# Patient Record
Sex: FEMALE | Race: OTHER | NOT HISPANIC OR LATINO | URBAN - METROPOLITAN AREA
[De-identification: names, ages, dates, MRNs, and addresses within clinical notes are randomized per-mention and may not be internally consistent; named-entity substitution may affect disease eponyms.]

---

## 2017-01-23 ENCOUNTER — EMERGENCY (EMERGENCY)
Facility: HOSPITAL | Age: 32
LOS: 1 days | Discharge: HOME | End: 2017-01-23
Attending: EMERGENCY MEDICINE
Payer: MEDICAID

## 2017-01-23 VITALS
HEIGHT: 64 IN | SYSTOLIC BLOOD PRESSURE: 121 MMHG | RESPIRATION RATE: 18 BRPM | OXYGEN SATURATION: 100 % | DIASTOLIC BLOOD PRESSURE: 85 MMHG | HEART RATE: 97 BPM | WEIGHT: 119.93 LBS | TEMPERATURE: 98 F
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
## 2017-01-23 DIAGNOSIS — S39.012A STRAIN OF MUSCLE, FASCIA AND TENDON OF LOWER BACK, INITIAL ENCOUNTER: ICD-10-CM

## 2017-01-23 PROCEDURE — 99283 EMERGENCY DEPT VISIT LOW MDM: CPT

## 2017-01-23 RX ORDER — IBUPROFEN 200 MG
600 TABLET ORAL ONCE
Qty: 0 | Refills: 0 | Status: COMPLETED | OUTPATIENT
Start: 2017-01-23 | End: 2017-01-23

## 2017-01-23 RX ORDER — METHOCARBAMOL 500 MG/1
1 TABLET, FILM COATED ORAL
Qty: 12 | Refills: 0 | OUTPATIENT
Start: 2017-01-23 | End: 2017-01-27

## 2017-01-23 RX ORDER — IBUPROFEN 200 MG
1 TABLET ORAL
Qty: 20 | Refills: 0 | OUTPATIENT
Start: 2017-01-23 | End: 2017-01-28

## 2017-01-23 RX ORDER — METHOCARBAMOL 500 MG/1
750 TABLET, FILM COATED ORAL ONCE
Qty: 0 | Refills: 0 | Status: COMPLETED | OUTPATIENT
Start: 2017-01-23 | End: 2017-01-23

## 2017-01-23 RX ADMIN — Medication 600 MILLIGRAM(S): at 16:38

## 2017-01-23 RX ADMIN — METHOCARBAMOL 750 MILLIGRAM(S): 500 TABLET, FILM COATED ORAL at 16:00

## 2017-01-23 RX ADMIN — Medication 600 MILLIGRAM(S): at 16:00

## 2017-01-23 NOTE — ED PROVIDER NOTE - MEDICAL DECISION MAKING DETAILS
30 y/o F pt presents w/ L upper back pain x1 week. Well appearing, vitals within normal limits, afebrile. History and findings consistent w/ muscle strain. Plan Ibuprofen, Robaxin, and D/C w/ PMD f/u.

## 2017-01-23 NOTE — ED PROVIDER NOTE - NS ED MD SCRIBE ATTENDING SCRIBE SECTIONS
PAST MEDICAL/SURGICAL/SOCIAL HISTORY/HIV/PHYSICAL EXAM/VITAL SIGNS( Pullset)/REVIEW OF SYSTEMS/HISTORY OF PRESENT ILLNESS/DISPOSITION

## 2017-01-23 NOTE — ED PROVIDER NOTE - ATTENDING CONTRIBUTION TO CARE
mid back pain  on and off several months  onset after heavy lifting at work  flareup again after lifting  paraspinal midthoracic ttp  improved after meds  NSAIDs, back specialist f/u  refrain from heavy lifting

## 2017-01-23 NOTE — ED PROVIDER NOTE - PROGRESS NOTE DETAILS
Feels better. History and findings suggestive of muscle strain. Plan: IBU and RObaxin Rx and discharge with PMD follow up. Pt is well appearing walking with steady gait, stable for discharge and follow up without fail with medical doctor. I had a detailed discussion with the patient and/or guardian regarding the historical points, exam findings, and any diagnostic results supporting the discharge diagnosis. Pt educated on care and need for follow up. Strict return instructions and red flag signs and symptoms discussed with patient. Questions answered. Pt shows understanding of discharge information and agrees to follow. The scribe's documentation has been prepared under my direction and personally reviewed by me in its entirety. I confirm that the note above actually reflects all work, treatment, procedures, and medical decision-making performed by me - ERICKA Schmitz

## 2017-01-23 NOTE — ED PROVIDER NOTE - OBJECTIVE STATEMENT
30 y/o M pt w/ no significant PMHx presents to ED c/o worsening L sided back pain x1 week. Pt describes her back pain as tightness. Pt states that her pain is worsened w/ movement. Pt denies fever, rash, urinary/bowel dysfunction, saddle anesthesia, or any other complaints. Pt states that she has taken Tylenol to mild relief. Pt notes that she is R hand dominant but carries her bag on her L shoulder normally. NKDA.

## 2017-10-02 ENCOUNTER — EMERGENCY (EMERGENCY)
Facility: HOSPITAL | Age: 32
LOS: 1 days | Discharge: HOME | End: 2017-10-02
Admitting: EMERGENCY MEDICINE
Payer: SELF-PAY

## 2017-10-02 VITALS
RESPIRATION RATE: 17 BRPM | HEART RATE: 95 BPM | WEIGHT: 126.99 LBS | TEMPERATURE: 99 F | OXYGEN SATURATION: 98 % | DIASTOLIC BLOOD PRESSURE: 91 MMHG | SYSTOLIC BLOOD PRESSURE: 136 MMHG | HEIGHT: 64 IN

## 2017-10-02 DIAGNOSIS — S61.212A LACERATION WITHOUT FOREIGN BODY OF RIGHT MIDDLE FINGER WITHOUT DAMAGE TO NAIL, INITIAL ENCOUNTER: ICD-10-CM
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
## 2017-10-02 DIAGNOSIS — S61.411A LACERATION WITHOUT FOREIGN BODY OF RIGHT HAND, INITIAL ENCOUNTER: ICD-10-CM
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
## 2017-10-02 PROCEDURE — 99283 EMERGENCY DEPT VISIT LOW MDM: CPT | Mod: 25

## 2017-10-02 PROCEDURE — 90471 IMMUNIZATION ADMIN: CPT

## 2017-10-02 PROCEDURE — 90715 TDAP VACCINE 7 YRS/> IM: CPT

## 2017-10-02 PROCEDURE — 12001 RPR S/N/AX/GEN/TRNK 2.5CM/<: CPT

## 2017-10-02 RX ORDER — TETANUS TOXOID, REDUCED DIPHTHERIA TOXOID AND ACELLULAR PERTUSSIS VACCINE, ADSORBED 5; 2.5; 8; 8; 2.5 [IU]/.5ML; [IU]/.5ML; UG/.5ML; UG/.5ML; UG/.5ML
0.5 SUSPENSION INTRAMUSCULAR ONCE
Qty: 0 | Refills: 0 | Status: COMPLETED | OUTPATIENT
Start: 2017-10-02 | End: 2017-10-02

## 2017-10-02 RX ADMIN — TETANUS TOXOID, REDUCED DIPHTHERIA TOXOID AND ACELLULAR PERTUSSIS VACCINE, ADSORBED 0.5 MILLILITER(S): 5; 2.5; 8; 8; 2.5 SUSPENSION INTRAMUSCULAR at 19:25

## 2017-10-02 NOTE — ED ADULT TRIAGE NOTE - ARRIVAL INFO ADDITIONAL COMMENTS
+laceration to right hand while reaching under the cooler and cut right hand. No bleeding, denies any numbness or tingling. Tetanus status unknown.

## 2017-10-02 NOTE — ED PROCEDURE NOTE - CPROC ED POST PROC CARE GUIDE1
Keep the cast/splint/dressing clean and dry./Verbal/written post procedure instructions were given to patient/caregiver./Bacitracin and sterile dressing./Instructed patient/caregiver to follow-up with primary care physician./Instructed patient/caregiver regarding signs and symptoms of infection.

## 2017-10-02 NOTE — ED ADULT NURSE NOTE - CHPI ED SYMPTOMS NEG
no bleeding at site/no chills/no vomiting/no fever/no drainage/no bleeding/no redness/no purulent drainage/no red streaks

## 2017-10-02 NOTE — ED PROVIDER NOTE - PHYSICAL EXAMINATION
GEN: awake, alert, NAD  EYES: Clear, Pupils equal and round.  PULM: Lungs clear  CV: RRR S1S2  GI: Abd soft, nontender  MSK: Right hand with 1.5 cm laceration over the dorsal hand, apx 2.5 cm proximal to the middle finger MCP joint.  No bleeding, and was able to be explored to base in bloodless field.  No foreign body.  Extensor tendon visualized through full ROM and there is no tendon injury.  Normal tendon function.  Fingers pink with brisk capillary refill.  No bony tenderness.  SKIN: normal color and turgor, no rash  NEURO: Alert, oriented. No gross movement abnormalities.  Speech clear.  Gait steady.  Motor and sensation intact in MRU distributions. GEN: awake, alert, NAD  EYES: Clear, Pupils equal and round.  PULM: Lungs clear  CV: RRR S1S2  GI: Abd soft, nontender  MSK: Right hand with 2 cm laceration over the dorsal hand, apx 2.5 cm proximal to the middle finger MCP joint.  No bleeding, and was able to be explored to base in bloodless field.  No foreign body.  Extensor tendon visualized through full ROM and there is no tendon injury.  Normal tendon function.  Fingers pink with brisk capillary refill.  No bony tenderness.  SKIN: normal color and turgor, no rash  NEURO: Alert, oriented. No gross movement abnormalities.  Speech clear.  Gait steady.  Motor and sensation intact in MRU distributions.

## 2017-10-02 NOTE — ED ADULT NURSE NOTE - OBJECTIVE STATEMENT
Patient comes in ambulatory into [facility] for laceration to right hand. Patient reports, "I was reaching into a cooler and cut myself on a piece of metal." Tetanus status unknown. Denies any numbness or tingling. +2 lacerations noted on right top of hand (1) approx 1cm x0.2cm and (2) 0.5cmx0.1cm, no active bleeding noted. Patient able to move all fingers bilaterally, cap refill less than 3 secs bilaterally, radial pulses good bilaterally, normal sensation in both hands bilaterally when touched with finger.

## 2017-10-02 NOTE — ED PROVIDER NOTE - OBJECTIVE STATEMENT
Pt is a RHD female who was at work today at 4:30 pm when she reached under a cooler and lacerated the dorsum of her right hand against a sharp edge.  No tingling, numbness, or swelling.   She applied sugar to try to control the bleeding.  Unknown last tetanus booster, says she had childhood vaccines.

## 2017-10-12 ENCOUNTER — EMERGENCY (EMERGENCY)
Facility: HOSPITAL | Age: 32
LOS: 1 days | Discharge: HOME | End: 2017-10-12
Attending: EMERGENCY MEDICINE
Payer: MEDICAID

## 2017-10-12 VITALS
RESPIRATION RATE: 18 BRPM | HEIGHT: 64 IN | TEMPERATURE: 98 F | DIASTOLIC BLOOD PRESSURE: 71 MMHG | SYSTOLIC BLOOD PRESSURE: 114 MMHG | WEIGHT: 126.99 LBS | OXYGEN SATURATION: 100 % | HEART RATE: 91 BPM

## 2017-10-12 PROCEDURE — G0463: CPT

## 2017-10-12 NOTE — ED PROVIDER NOTE - OBJECTIVE STATEMENT
33 y/o F pt w/ no significant PMHx presents to ED c/o R hand swelling since yesterday. Pt states that she had sutures placed to a R hand laceration 10 days ago; pt was told to present after 10-11 days for suture removal. Pt reports that she has been working w/ her hands since getting stitches. Pt denies fever, chills, or any other complaints. NKDA.

## 2017-10-12 NOTE — ED ADULT NURSE NOTE - OBJECTIVE STATEMENT
AOX3 +ambulatory patient reports right hand pain. As per patient she got stitches 10 days ago and notice her hand is swelling. Patient denies any fevers or chills.

## 2017-10-15 DIAGNOSIS — S61.411D LACERATION WITHOUT FOREIGN BODY OF RIGHT HAND, SUBSEQUENT ENCOUNTER: ICD-10-CM
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
## 2020-11-18 ENCOUNTER — EMERGENCY (EMERGENCY)
Facility: HOSPITAL | Age: 35
LOS: 1 days | Discharge: HOME | End: 2020-11-18
Attending: STUDENT IN AN ORGANIZED HEALTH CARE EDUCATION/TRAINING PROGRAM | Admitting: EMERGENCY MEDICINE
Payer: COMMERCIAL

## 2020-11-18 VITALS
DIASTOLIC BLOOD PRESSURE: 93 MMHG | HEART RATE: 108 BPM | SYSTOLIC BLOOD PRESSURE: 147 MMHG | OXYGEN SATURATION: 100 % | WEIGHT: 127.87 LBS | TEMPERATURE: 99 F | HEIGHT: 64 IN | RESPIRATION RATE: 16 BRPM

## 2020-11-18 VITALS — HEART RATE: 95 BPM

## 2020-11-18 LAB — SARS-COV-2 RNA SPEC QL NAA+PROBE: SIGNIFICANT CHANGE UP

## 2020-11-18 PROCEDURE — 99283 EMERGENCY DEPT VISIT LOW MDM: CPT

## 2020-11-18 PROCEDURE — 87635 SARS-COV-2 COVID-19 AMP PRB: CPT

## 2020-11-18 NOTE — ED PROVIDER NOTE - PATIENT PORTAL LINK FT
You can access the FollowMyHealth Patient Portal offered by [facility] by registering at the following website: [email or web address]. By joining [employer]’s FollowMyHealth portal, you will also be able to view your health information using other applications (apps) compatible with our system.

## 2020-11-18 NOTE — ED PROVIDER NOTE - NSFOLLOWUPINSTRUCTIONS_ED_ALL_ED_FT
You may have Coronavirus      Return to the ED immediately if you have *shortness of breath*, fever, pain, weakness, vomiting any concerns.      1. Wash hands for at least 20 seconds.  2. Ensure you are well hydrated, drink plenty of fluids.  3. For your Fever and Body aches takes TYLENOL 650 mg every 6 hours   4. Stay away from anyone that is elderly, even if you live with them.  5. Even if you feel better, you must stay isolated for at least 3 days after your symptoms resolve without taking tylenol.    RETURN TO THE ER IMMEDIATELY IF YOU HAVE WORSENING SHORTNESS OF BREATH OR Oxygen < 93%

## 2020-11-18 NOTE — ED PROVIDER NOTE - CLINICAL SUMMARY MEDICAL DECISION MAKING FREE TEXT BOX
Pt asymptomatic, presents for covid testing.  Will perform test and advise patient to isolate in accodance with local, state and federal guidelines.

## 2020-11-18 NOTE — ED PROVIDER NOTE - CHPI ED SYMPTOMS NEG
no decreased eating/drinking/no headache/no rash/no chills/no diarrhea/no cough/no abdominal pain/no fever/no shortness of breath/no vomiting

## 2020-11-18 NOTE — ED PROVIDER NOTE - ATTENDING CONTRIBUTION TO CARE
I performed an interview and physical examination for this patient. I agree with the ACP's documentation and disposition.

## 2021-05-17 ENCOUNTER — APPOINTMENT (OUTPATIENT)
Dept: DISASTER EMERGENCY | Facility: OTHER | Age: 36
End: 2021-05-17
Payer: COMMERCIAL

## 2021-05-17 PROCEDURE — 0012A: CPT

## 2021-11-13 ENCOUNTER — TRANSCRIPTION ENCOUNTER (OUTPATIENT)
Age: 36
End: 2021-11-13

## 2022-04-14 NOTE — ED ADULT NURSE NOTE - CAS TRG GEN SKIN COLOR
Lab is calling because patient rescheduled her INR draw for tomorrow to Saturday and wanted to update our clinic.  
Normal for race

## 2023-03-20 ENCOUNTER — EMERGENCY (EMERGENCY)
Facility: HOSPITAL | Age: 38
LOS: 1 days | Discharge: HOME | End: 2023-03-20
Attending: INTERNAL MEDICINE | Admitting: INTERNAL MEDICINE
Payer: COMMERCIAL

## 2023-03-20 VITALS
OXYGEN SATURATION: 100 % | HEIGHT: 64 IN | TEMPERATURE: 98 F | SYSTOLIC BLOOD PRESSURE: 135 MMHG | DIASTOLIC BLOOD PRESSURE: 89 MMHG | HEART RATE: 98 BPM | WEIGHT: 139.99 LBS | RESPIRATION RATE: 18 BRPM

## 2023-03-20 LAB
APPEARANCE UR: ABNORMAL
BACTERIA # UR AUTO: ABNORMAL
BILIRUB UR-MCNC: NEGATIVE — SIGNIFICANT CHANGE UP
COLOR SPEC: ABNORMAL
DIFF PNL FLD: ABNORMAL
EPI CELLS # UR: SIGNIFICANT CHANGE UP
GLUCOSE UR QL: NEGATIVE — SIGNIFICANT CHANGE UP
HCG UR QL: NEGATIVE — SIGNIFICANT CHANGE UP
KETONES UR-MCNC: NEGATIVE — SIGNIFICANT CHANGE UP
LEUKOCYTE ESTERASE UR-ACNC: ABNORMAL
NITRITE UR-MCNC: NEGATIVE — SIGNIFICANT CHANGE UP
PH UR: 5 — SIGNIFICANT CHANGE UP (ref 5–8)
PROT UR-MCNC: 500 MG/DL
RBC CASTS # UR COMP ASSIST: >50 /HPF (ref 0–4)
SP GR SPEC: 1.02 — SIGNIFICANT CHANGE UP (ref 1.01–1.02)
UROBILINOGEN FLD QL: NEGATIVE — SIGNIFICANT CHANGE UP
WBC UR QL: ABNORMAL

## 2023-03-20 PROCEDURE — 99283 EMERGENCY DEPT VISIT LOW MDM: CPT

## 2023-03-20 PROCEDURE — 87086 URINE CULTURE/COLONY COUNT: CPT

## 2023-03-20 PROCEDURE — 99284 EMERGENCY DEPT VISIT MOD MDM: CPT

## 2023-03-20 PROCEDURE — 81001 URINALYSIS AUTO W/SCOPE: CPT

## 2023-03-20 PROCEDURE — 87186 SC STD MICRODIL/AGAR DIL: CPT

## 2023-03-20 PROCEDURE — 81025 URINE PREGNANCY TEST: CPT

## 2023-03-20 RX ORDER — PHENAZOPYRIDINE HCL 100 MG
200 TABLET ORAL ONCE
Refills: 0 | Status: COMPLETED | OUTPATIENT
Start: 2023-03-20 | End: 2023-03-20

## 2023-03-20 RX ADMIN — Medication 200 MILLIGRAM(S): at 23:29

## 2023-03-20 RX ADMIN — Medication 1 TABLET(S): at 23:29

## 2023-03-20 NOTE — ED PROVIDER NOTE - CARE PROVIDER_API CALL
Jerry Moreno)  Urology  [address] [place] [address], [address]  [place] [address]  Phone: [phone]  Fax: [phone]  Follow Up Time: 1-3 Days

## 2023-03-20 NOTE — ED PROVIDER NOTE - OBJECTIVE STATEMENT
36 y/o female received ambulatory to triage. Alert and oriented x4. C/o painful urination that started around 9pm tonight. Pt denies any cp, sob, n/v/d, dizziness, headache, fever/chills at this time.  urinary symptoms 36 y/o female  C/C painful urination that started around 9pm tonight. Pt denies any cp, sob, abdominal pain, back pain,  n/v/d, dizziness, headache, fever/chills

## 2023-03-20 NOTE — ED PROVIDER NOTE - PATIENT PORTAL LINK FT
You can access the FollowMyHealth Patient Portal offered by [facility] by registering at the following website: [email or web address]. By joining [employer]’s FollowMyHealth portal, you will also be able to view your health information using other applications (apps) compatible with our system.

## 2023-03-20 NOTE — ED PROVIDER NOTE - CARE PROVIDERS DIRECT ADDRESSES
[email or web address]

## 2023-03-20 NOTE — ED ADULT TRIAGE NOTE - CHIEF COMPLAINT QUOTE
38 y/o female received ambulatory to triage. Alert and oriented x4. C/o painful urination that started around 9pm tonight. Pt denies any cp, sob, n/v/d, dizziness, headache, fever/chills at this time.

## 2023-03-21 RX ORDER — PHENAZOPYRIDINE HCL 100 MG
1 TABLET ORAL
Qty: 9 | Refills: 0
Start: 2023-03-21 | End: 2023-03-23

## 2023-03-21 NOTE — ED ADULT NURSE NOTE - OBJECTIVE STATEMENT
36 y/o female received ambulatory from triage. Alert and oriented x4. C/o urinary symptoms, pain with urination x a few hours. Denies fever/chills.

## 2023-03-24 RX ORDER — CEFUROXIME AXETIL 250 MG
1 TABLET ORAL
Qty: 10 | Refills: 0
Start: 2023-03-24 | End: 2023-03-28

## 2023-09-03 ENCOUNTER — EMERGENCY (EMERGENCY)
Facility: HOSPITAL | Age: 38
LOS: 1 days | Discharge: HOME | End: 2023-09-03
Attending: EMERGENCY MEDICINE | Admitting: EMERGENCY MEDICINE
Payer: COMMERCIAL

## 2023-09-03 VITALS
SYSTOLIC BLOOD PRESSURE: 127 MMHG | HEART RATE: 84 BPM | OXYGEN SATURATION: 98 % | DIASTOLIC BLOOD PRESSURE: 87 MMHG | RESPIRATION RATE: 16 BRPM | TEMPERATURE: 98 F

## 2023-09-03 VITALS
TEMPERATURE: 98 F | SYSTOLIC BLOOD PRESSURE: 136 MMHG | RESPIRATION RATE: 16 BRPM | DIASTOLIC BLOOD PRESSURE: 90 MMHG | WEIGHT: 143.08 LBS | HEART RATE: 90 BPM | OXYGEN SATURATION: 100 % | HEIGHT: 64 IN

## 2023-09-03 LAB — HCG UR QL: NEGATIVE — SIGNIFICANT CHANGE UP

## 2023-09-03 PROCEDURE — 99284 EMERGENCY DEPT VISIT MOD MDM: CPT | Mod: 25

## 2023-09-03 PROCEDURE — 81025 URINE PREGNANCY TEST: CPT

## 2023-09-03 PROCEDURE — 76642 ULTRASOUND BREAST LIMITED: CPT | Mod: 26,RT

## 2023-09-03 PROCEDURE — 76642 ULTRASOUND BREAST LIMITED: CPT

## 2023-09-03 PROCEDURE — 99284 EMERGENCY DEPT VISIT MOD MDM: CPT

## 2023-09-03 NOTE — ED PROVIDER NOTE - NS ED ATTENDING STATEMENT MOD
This was a shared visit with the DORIS. I reviewed and verified the documentation and independently performed the documented:

## 2023-09-03 NOTE — ED ADULT NURSE REASSESSMENT NOTE - NS ED NURSE REASSESS COMMENT FT1
pt is AOX4. observed in positive mood. No c/o pain at this time. no redness or discharge noted.   R breast continues to have swelling and R nipple inverted. US results pending. Care continued.

## 2023-09-03 NOTE — ED PROVIDER NOTE - PROGRESS NOTE DETAILS
Results of breast ultrasound discussed with patient and sister-in-law, Yunier, at bedside.  Understands breast mass is highly suspicious for cancer.  Will give patient information for breast surgeon.  Patient understands return precautions for new or worsening symptoms.

## 2023-09-03 NOTE — ED ADULT TRIAGE NOTE - AS PAIN REST
8 (severe pain) Finasteride Pregnancy And Lactation Text: This medication is absolutely contraindicated during pregnancy. It is unknown if it is excreted in breast milk.

## 2023-09-03 NOTE — ED PROVIDER NOTE - CLINICAL SUMMARY MEDICAL DECISION MAKING FREE TEXT BOX
Patient is a 38-year-old female with few months of fullness felt in right breast with inversion of the nipple today mild pain in the same region thus presenting here for evaluation.  This case will require evaluation followed by ultrasound imaging.

## 2023-09-03 NOTE — ED ADULT TRIAGE NOTE - CHIEF COMPLAINT QUOTE
Patient walked in with c/o right sided breast and nipple pain, "feels hard" since this morning. Patient denies having this pain before. Patient denies injury or breast feeding. Patient denies significant PMHx.

## 2023-09-03 NOTE — ED PROVIDER NOTE - OBJECTIVE STATEMENT
38-year-old female with no past medical history presents to the ED complaining of right breast pain and area of firmness since 4 AM this morning.  Patient states she has noticed the firmness to her breast for the past 4 months as well as inversion of her nipple.  Patient has not been evaluated for this.  Denies fever, chills, chest pain, shortness of breath, abdominal pain, nausea, vomiting, upper or lower extremity weakness or paresthesias. no nipple discharge or skin color changes of breast.

## 2023-09-03 NOTE — ED PROVIDER NOTE - CARE PROVIDER_API CALL
Ab Collins  Surgery  [address] [place] [address]  Phone: [phone]  Fax: [phone]  Follow Up Time: 1-3 Days

## 2023-09-03 NOTE — ED ADULT NURSE NOTE - OBJECTIVE STATEMENT
Pt is AOX4. c/o R breast nipple and swelling. Pt observed with inverted nipple on R breast. No redness or discharge noted, swelling around nipple noted. Pt has no medical HX, denies breast feeding, denies fevers/chills. Denies allergies to medicine. Full ROM on all 4 extremities. No skin discoloration noted. Pt noticed this morning only. Pending MD garcia and orders. Care continued.

## 2023-09-03 NOTE — ED PROVIDER NOTE - SKIN, MLM
Skin intact. No evidence of rash. + area of firmness at 12 o'clock on breast just above nipple with tenderness and nipple inversion. no nipple discharge. no redness, increased warmth or skin color changes to breast. cap refill <2 sec

## 2023-09-03 NOTE — ED PROVIDER NOTE - PATIENT PORTAL LINK FT
You can access the FollowMyHealth Patient Portal offered by [facility] by registering at the following website: [email or web address]. By joining [employer]’s FollowMyHealth portal, you will also be able to view your health information using other applications (apps) compatible with our system.

## 2023-09-03 NOTE — ED PROVIDER NOTE - ATTENDING APP SHARED VISIT CONTRIBUTION OF CARE
Examination reveals a well-developed well-nourished female in no acute distress with a breast exam that reveals fullness minimally tender right breast just superior to the nipple running from 10:00 to 2:00 on the dial midline upper quadrant with superior deviation inversion of the nipple.  I agree with plan and management outlined by PA.

## 2023-09-05 PROBLEM — Z00.00 ENCOUNTER FOR PREVENTIVE HEALTH EXAMINATION: Status: ACTIVE | Noted: 2023-09-05

## 2024-02-16 NOTE — ED ADULT NURSE NOTE - CAS ELECT INFOMATION PROVIDED
Avinash states she did receive your response and has this taken care of.  Thank you   
DC instructions